# Patient Record
Sex: FEMALE | Race: WHITE
[De-identification: names, ages, dates, MRNs, and addresses within clinical notes are randomized per-mention and may not be internally consistent; named-entity substitution may affect disease eponyms.]

---

## 2018-03-30 ENCOUNTER — HOSPITAL ENCOUNTER (OUTPATIENT)
Dept: HOSPITAL 74 - JER | Age: 38
Setting detail: OBSERVATION
LOS: 2 days | Discharge: HOME | End: 2018-04-01
Attending: INTERNAL MEDICINE | Admitting: HOSPITALIST
Payer: COMMERCIAL

## 2018-03-30 VITALS — BODY MASS INDEX: 20.7 KG/M2

## 2018-03-30 DIAGNOSIS — M51.26: ICD-10-CM

## 2018-03-30 DIAGNOSIS — M62.830: Primary | ICD-10-CM

## 2018-03-30 LAB
ALBUMIN SERPL-MCNC: 4.1 G/DL (ref 3.4–5)
ALP SERPL-CCNC: 47 U/L (ref 45–117)
ALT SERPL-CCNC: 17 U/L (ref 12–78)
ANION GAP SERPL CALC-SCNC: 10 MMOL/L (ref 8–16)
AST SERPL-CCNC: 12 U/L (ref 15–37)
BASOPHILS # BLD: 0.4 % (ref 0–2)
BILIRUB SERPL-MCNC: 0.6 MG/DL (ref 0.2–1)
BUN SERPL-MCNC: 8 MG/DL (ref 7–18)
CALCIUM SERPL-MCNC: 8.2 MG/DL (ref 8.5–10.1)
CHLORIDE SERPL-SCNC: 109 MMOL/L (ref 98–107)
CO2 SERPL-SCNC: 20 MMOL/L (ref 21–32)
CREAT SERPL-MCNC: 0.4 MG/DL (ref 0.55–1.02)
DEPRECATED RDW RBC AUTO: 13.7 % (ref 11.6–15.6)
EOSINOPHIL # BLD: 0 % (ref 0–4.5)
GLUCOSE SERPL-MCNC: 65 MG/DL (ref 74–106)
HCT VFR BLD CALC: 33.7 % (ref 32.4–45.2)
HGB BLD-MCNC: 11.5 GM/DL (ref 10.7–15.3)
LYMPHOCYTES # BLD: 20.1 % (ref 8–40)
MAGNESIUM SERPL-MCNC: 2.1 MG/DL (ref 1.8–2.4)
MCH RBC QN AUTO: 30.7 PG (ref 25.7–33.7)
MCHC RBC AUTO-ENTMCNC: 34.2 G/DL (ref 32–36)
MCV RBC: 89.7 FL (ref 80–96)
MONOCYTES # BLD AUTO: 9.4 % (ref 3.8–10.2)
NEUTROPHILS # BLD: 70.1 % (ref 42.8–82.8)
PLATELET # BLD AUTO: 190 K/MM3 (ref 134–434)
PMV BLD: 10.4 FL (ref 7.5–11.1)
POTASSIUM SERPLBLD-SCNC: 3.9 MMOL/L (ref 3.5–5.1)
PROT SERPL-MCNC: 7.2 G/DL (ref 6.4–8.2)
RBC # BLD AUTO: 3.76 M/MM3 (ref 3.6–5.2)
SODIUM SERPL-SCNC: 139 MMOL/L (ref 136–145)
WBC # BLD AUTO: 7.1 K/MM3 (ref 4–10)

## 2018-03-30 PROCEDURE — G0378 HOSPITAL OBSERVATION PER HR: HCPCS

## 2018-03-30 RX ADMIN — SODIUM CHLORIDE SCH MLS/HR: 9 INJECTION, SOLUTION INTRAVENOUS at 23:14

## 2018-03-30 NOTE — HP
CHIEF COMPLAINT:





PCP:





HISTORY OF PRESENT ILLNESS:








ER course was notable for:


(1)


(2)


(3)





Recent Travel:





PAST MEDICAL HISTORY:





PAST SURGICAL HISTORY:





Social History:


Smoking:


Alcohol:


Drugs: 





Family History:


Allergies





naproxen Allergy (Intermediate, Verified 03/30/18 17:01)


 Swelling








HOME MEDICATIONS:


 Home Medications











 Medication  Instructions  Recorded


 


Ibuprofen 600 mg PO PRN PRN 03/30/18








REVIEW OF SYSTEMS


CONSTITUTIONAL: 


Absent:  fever, chills, diaphoresis, generalized weakness, malaise, loss of 

appetite, weight change


HEENT: 


Absent:  rhinorrhea, nasal congestion, throat pain, throat swelling, difficulty 

swallowing, mouth swelling, ear pain, eye pain, visual changes


CARDIOVASCULAR: 


Absent: chest pain, syncope, palpitations, irregular heart rate, lightheadedness

, peripheral edema


RESPIRATORY: 


Absent: cough, shortness of breath, dyspnea with exertion, orthopnea, wheezing, 

stridor, hemoptysis


GASTROINTESTINAL:


Absent: abdominal pain, abdominal distension, nausea, vomiting, diarrhea, 

constipation, melena, hematochezia


GENITOURINARY: 


Absent: dysuria, frequency, urgency, hesitancy, hematuria, flank pain, genital 

pain


MUSCULOSKELETAL: 


Absent: myalgia, arthralgia, joint swelling, back pain, neck pain


SKIN: 


Absent: rash, itching, pallor


HEMATOLOGIC/IMMUNOLOGIC: 


Absent: easy bleeding, easy bruising, lymphadenopathy, frequent infections


ENDOCRINE:


Absent: unexplained weight gain, unexplained weight loss, heat intolerance, 

cold intolerance


NEUROLOGIC: 


Absent: headache, focal weakness or paresthesias, dizziness, unsteady gait, 

seizure, mental status changes, bladder or bowel incontinence


PSYCHIATRIC: 


Absent: anxiety, depression, suicidal or homicidal ideation, hallucinations.








PHYSICAL EXAMINATION


 Vital Signs - 24 hr











  03/30/18





  17:02


 


Temperature 98.2 F


 


Pulse Rate 84


 


Respiratory 18





Rate 


 


Blood Pressure 142/73


 


O2 Sat by Pulse 99





Oximetry (%) 











GENERAL: Awake, alert, and fully oriented, in no acute distress.


HEAD: Normal with no signs of trauma.


EYES: Pupils equal, round and reactive to light, extraocular movements intact, 

sclera anicteric, conjunctiva clear. No lid lag.


EARS, NOSE, THROAT: Ears normal, nares patent, oropharynx clear without 

exudates. Moist mucous membranes.


NECK: Normal range of motion, supple without lymphadenopathy, JVD, or masses.


LUNGS: Breath sounds equal, clear to auscultation bilaterally. No wheezes, and 

no crackles. No accessory muscle use.


HEART: Regular rate and rhythm, normal S1 and S2 without murmur, rub or gallop.


ABDOMEN: Soft, nontender, not distended, normoactive bowel sounds, no guarding, 

no rebound, no masses.  No hepatomegaly or  splenomegaly. 


MUSCULOSKELETAL: Normal range of motion at all joints. No bony deformities or 

tenderness. No CVA tenderness.


UPPER EXTREMITIES: 2+ pulses, warm, well-perfused. No cyanosis. No clubbing. No 

peripheral edema.


LOWER EXTREMITIES: 2+ pulses, warm, well-perfused. No calf tenderness. No 

peripheral edema. 


NEUROLOGICAL:  Cranial nerves II-XII intact. Normal speech. Normal gait.


PSYCHIATRIC: Cooperative. Good eye contact. Appropriate mood and affect.


SKIN: Warm, dry, normal turgor, no rashes or lesions noted, normal capillary 

refill. 


 Laboratory Results - last 24 hr











  03/30/18 03/30/18 03/30/18





  17:32 20:00 20:00


 


WBC   7.1 


 


RBC   3.76 


 


Hgb   11.5 


 


Hct   33.7 


 


MCV   89.7 


 


MCH   30.7 


 


MCHC   34.2 


 


RDW   13.7 


 


Plt Count   190 


 


MPV   10.4 


 


Neutrophils %   70.1 


 


Lymphocytes %   20.1 


 


Monocytes %   9.4 


 


Eosinophils %   0.0 


 


Basophils %   0.4 


 


Sodium    139


 


Potassium    3.9


 


Chloride    109 H


 


Carbon Dioxide    20 L


 


Anion Gap    10


 


BUN    8


 


Creatinine    0.4 L


 


Creat Clearance w eGFR    > 60


 


Random Glucose    65 L


 


Calcium    8.2 L


 


Magnesium    2.1


 


Total Bilirubin    0.6


 


AST    12 L


 


ALT    17


 


Alkaline Phosphatase    47


 


Total Protein    7.2


 


Albumin    4.1


 


Serum Pregnancy, Qual  Negative  











ASSESSMENT/PLAN:








Visit type





- Critical Care


Critical Care patient: No





Hospitalist Screening





- Colonoscopy Questionnaire


Colonoscopy Questionnaire: 





Colonoscopy Questionnaire

## 2018-03-30 NOTE — HP
CHIEF COMPLAINT: Severe back pain





PCP: Dr. Eddy





HISTORY OF PRESENT ILLNESS:


58 year old F with pmh of back spasms (2 yrs ago tx with IM injections and PT) 

presented with severe back spasms. Patient states her back started becoming 

tight on Monday and then today went into bad spasms after she woke up. The pain 

was 10/10 in her left lower back radiating circumferentially around her left 

thigh. She tried ibuprofen with no help. Patient states the pain is sharp and 

shooting and is 10/10. Patient states that she had a back injury 6 years ago 

where she "threw out her back". She denies any fever, chills, chest pain, sob, 

abdominal pain, numbness, tingling, weakness, heavy vaginal bleeding, irregular 

vaginal bleeding, n/v/d/c. Patient denies loss of bowel/bladder function. 

Patient denies any trauma to the area. 





ER course was notable for:


(1) labs


(2) pain medications





Recent Travel:


denies


PAST MEDICAL HISTORY:


back spams


PAST SURGICAL HISTORY:


denies


Social History:


Smoking: rarely


Alcohol: 3-4 drinks/week


Drugs:  denies





Family History:


Allergies





naproxen Allergy (Intermediate, Verified 03/30/18 17:01)


 Swelling








HOME MEDICATIONS:


 Home Medications











 Medication  Instructions  Recorded


 


Ibuprofen 600 mg PO PRN PRN 03/30/18








REVIEW OF SYSTEMS


CONSTITUTIONAL: 


Absent:  fever, chills, diaphoresis, generalized weakness, malaise, loss of 

appetite, weight change


HEENT: 


Absent:  rhinorrhea, nasal congestion, throat pain, throat swelling, difficulty 

swallowing, mouth swelling, ear pain, eye pain, visual changes


CARDIOVASCULAR: 


Absent: chest pain, syncope, palpitations, irregular heart rate, lightheadedness

, peripheral edema


RESPIRATORY: 


Absent: cough, shortness of breath, dyspnea with exertion, orthopnea, wheezing, 

stridor, hemoptysis


GASTROINTESTINAL:


Absent: abdominal pain, abdominal distension, nausea, vomiting, diarrhea, 

constipation, melena, hematochezia


GENITOURINARY: 


Absent: dysuria, frequency, urgency, hesitancy, hematuria, flank pain, genital 

pain


MUSCULOSKELETAL: 


Absent: myalgia, arthralgia, joint swelling, back pain, neck pain


SKIN: 


Absent: rash, itching, pallor


HEMATOLOGIC/IMMUNOLOGIC: 


Absent: easy bleeding, easy bruising, lymphadenopathy, frequent infections


ENDOCRINE:


Absent: unexplained weight gain, unexplained weight loss, heat intolerance, 

cold intolerance


NEUROLOGIC: 


Absent: headache, focal weakness or paresthesias, dizziness, unsteady gait, 

seizure, mental status changes, bladder or bowel incontinence


PSYCHIATRIC: 


Absent: anxiety, depression, suicidal or homicidal ideation, hallucinations.








PHYSICAL EXAMINATION


 Vital Signs - 24 hr











  03/30/18





  17:02


 


Temperature 98.2 F


 


Pulse Rate 84


 


Respiratory 18





Rate 


 


Blood Pressure 142/73


 


O2 Sat by Pulse 99





Oximetry (%) 











GENERAL: Awake, alert, and fully oriented, in no acute distress.


HEAD: Normal with no signs of trauma.


EYES: Extraocular movements intact, sclera anicteric, conjunctiva clear. No lid 

lag.


EARS, NOSE, THROAT: Moist mucous membranes


NECK: Decreased ROM when attempting to put chin to chest, supple without 

lymphadenopathy, JVD, or masses.


LUNGS: Breath sounds equal, clear to auscultation bilaterally--anteriorly. No 

wheezes, and no crackles. No accessory muscle use.


HEART: Regular rate and rhythm, normal S1 and S2 without murmur, rub or gallop.


ABDOMEN: Soft, nontender, not distended, normoactive bowel sounds, no guarding, 

no rebound, no masses.  No hepatomegaly or  splenomegaly. 


MUSCULOSKELETAL: +Tenderness to palpation of left lower back


UPPER EXTREMITIES: 2+ pulses, warm, well-perfused. No cyanosis. No clubbing. No 

peripheral edema.


LOWER EXTREMITIES: 2+ pulses, warm, well-perfused. No calf tenderness. No 

peripheral edema. 


PSYCHIATRIC: Cooperative. Good eye contact. Appropriate mood and affect.


SKIN: Warm, dry, normal turgor, no rashes or lesions noted, normal capillary 

refill. 


 Laboratory Results - last 24 hr











  03/30/18 03/30/18 03/30/18





  17:32 20:00 20:00


 


WBC   7.1 


 


RBC   3.76 


 


Hgb   11.5 


 


Hct   33.7 


 


MCV   89.7 


 


MCH   30.7 


 


MCHC   34.2 


 


RDW   13.7 


 


Plt Count   190 


 


MPV   10.4 


 


Neutrophils %   70.1 


 


Lymphocytes %   20.1 


 


Monocytes %   9.4 


 


Eosinophils %   0.0 


 


Basophils %   0.4 


 


Sodium    139


 


Potassium    3.9


 


Chloride    109 H


 


Carbon Dioxide    20 L


 


Anion Gap    10


 


BUN    8


 


Creatinine    0.4 L


 


Creat Clearance w eGFR    > 60


 


Random Glucose    65 L


 


Calcium    8.2 L


 


Magnesium    2.1


 


Total Bilirubin    0.6


 


AST    12 L


 


ALT    17


 


Alkaline Phosphatase    47


 


Total Protein    7.2


 


Albumin    4.1


 


Serum Pregnancy, Qual  Negative  











ASSESSMENT/PLAN:


37 year old F with pmh of back spasms admitted for intractable back pain/spasms





#Intractable back pain/spasms


-NS @ 100 cc/hr


-Valium prn


-Flexeril daily


-Morphine prn


-Tylenol prn


-check cpk/ua





#fen/gi


-ns @ 100 cc/hr


-wnl


-regular diet





#ppx


-Teds





#obs





Visit type





- Emergency Visit


Emergency Visit: Yes


Care time: The patient presented to the Emergency Department on the above date 

and was hospitalized for further evaluation of their emergent condition.





- New Patient


This patient is new to me today: Yes


Date on this admission: 03/30/18





- Critical Care


Critical Care patient: No





Hospitalist Screening





- Colonoscopy Questionnaire


Colonoscopy Questionnaire: 





Colonoscopy Questionnaire








-   Patient:


50 - 75 years old and never had a screening colonoscopy: Unknown


History of colon or rectal polyps, or CA: Unknown


History of IBD, Crohn's disease or UC: Unknown


History of abdominal radiation therapy as a child: Unknown





-   Relative:


1 with colon or rectal CA, or polyps at age 60 or younger: Unknown


Colon or rectal CA diagnosed at age 45 or younger: Unknown


Multiple relatives with colon or rectal CA: Unknown





-   Outcome:


Screening Result: Negative Screen

## 2018-03-30 NOTE — PDOC
History of Present Illness





- General


History Source: Patient, Sibling


Exam Limitations: No Limitations





- History of Present Illness


Initial Comments: 





03/30/18 17:31


The patient is 37 year old female, with a significant past medical history of 

back pain, who is brought to the ER accompanied by family member complaining of 

lower back pain since earlier today. The patient reports getting a massage on 

Monday, which triggered muscle soreness. She reports using epsom salt baths and 

ice packs with mild relief of symptoms. However today while showering patient 

reports the pain became worse. The patient states she then laid bed and applied 

muscle stimulators to her lower back with minimal relief of symptoms. Patient 

reports pain is exacerbated with movement.  The patient reports a history of 

back pain in the past, for which she has followed up with neurology and was 

told the pain was muscular not skeletal. At the time, patient states she 

received an injection which alleviated her symptoms. The patient states she 

takes Ibuprofen for her pain as needed. The patient reports her last meal was 

yesterday. She denies any flank pain, dysuria, or hematuria. She denies any 

fever, chills, headache, or dizziness. She denies any recent travel or sick 

contacts.





Allergies: Naproxen 


Past Surgical History: None reported


Social History: Social ETOH use. Non smoker. No recreational drug use.








<Katharine Weiss - Last Filed: 03/30/18 17:31>





<Carmelita Dominguez - Last Filed: 03/30/18 19:16>





- General


History Source: Patient


Exam Limitations: No Limitations





<Ariel Fay - Last Filed: 03/30/18 22:06>





- General


Chief Complaint: Back Pain


Stated Complaint: PAIN, ACUTE


Time Seen by Provider: 03/30/18 16:50





Past History





<Katharine Weiss - Last Filed: 03/30/18 17:31>





<Carmelita Dominguez - Last Filed: 03/30/18 19:16>





- Past Medical History


COPD: No


Other medical history: BACK PAIN.





- Suicide/Smoking/Psychosocial Hx


Smoking History: Never smoked


Hx Alcohol Use: No


Drug/Substance Use Hx: No


Substance Use Type: None





<Ariel Fay - Last Filed: 03/30/18 22:06>





- Past Medical History


Allergies/Adverse Reactions: 


 Allergies











Allergy/AdvReac Type Severity Reaction Status Date / Time


 


naproxen Allergy Intermediate Swelling Verified 03/30/18 17:01











Home Medications: 


Ambulatory Orders





Ibuprofen 600 mg PO PRN PRN 03/30/18 











**Review of Systems





- Review of Systems


Able to Perform ROS?: Yes


Comments:: 





03/30/18 17:31


GENERAL/CONSTITUTIONAL: No fever or chills. No weakness.


HEAD, EYES, EARS, NOSE AND THROAT: No change in vision. No ear pain or 

discharge. No sore throat.


CARDIOVASCULAR: No chest pain or shortness of breath.


RESPIRATORY: No cough, wheezing, or hemoptysis.


GASTROINTESTINAL: No nausea, vomiting, diarrhea or constipation.


GENITOURINARY: No dysuria, frequency, or change in urination.


MUSCULOSKELETAL: (+) lower back pain. No neck pain.


SKIN: No rash


NEUROLOGIC: No headache, vertigo, loss of consciousness, or change in strength/

sensation.


ENDOCRINE: No increased thirst. No abnormal weight change.


HEMATOLOGIC/LYMPHATIC: No anemia, easy bleeding, or history of blood clots.


ALLERGIC/IMMUNOLOGIC: No hives or skin allergy.








<Katharine Weiss - Last Filed: 03/30/18 17:31>





*Physical Exam





- Vital Signs


 Last Vital Signs











Temp Pulse Resp BP Pulse Ox


 


 98.2 F   84   18   142/73   99 


 


 03/30/18 17:02  03/30/18 17:02  03/30/18 17:02  03/30/18 17:02  03/30/18 17:02














- Physical Exam


Comments: 





03/30/18 17:31


GENERAL: Awake, alert, and fully oriented, appears uncomfortable


HEAD: No signs of trauma


EYES: PERRLA, EOMI, sclera anicteric, conjunctiva clear


ENT: Auricles normal inspection, hearing grossly normal, nares patent, 

oropharynx clear without exudates. Moist mucosa


NECK: Normal ROM, supple, no lymphadenopathy, JVD, or masses


LUNGS: Breath sounds equal, clear to auscultation bilaterally.  No wheezes, and 

no crackles


HEART: Regular rate and rhythm, normal S1 and S2, no murmurs, rubs or gallops


BACK: (+) muscle spasm, left lower back tenderness to palpation, No bony 

tenderness or step offs 


EXTREMITIES: Normal range of motion, no edema.  No clubbing or cyanosis. No 

cords, erythema, or tenderness


NEUROLOGICAL: Cranial nerves II through XII grossly intact.  Normal speech, 

normal gait


SKIN: Warm, Dry, normal turgor, no rashes or lesions noted.








<Katharine Weiss - Last Filed: 03/30/18 17:31>





- Vital Signs


 Last Vital Signs











Temp Pulse Resp BP Pulse Ox


 


 98.2 F   84   18   142/73   99 


 


 03/30/18 17:02  03/30/18 17:02  03/30/18 17:02  03/30/18 17:02  03/30/18 17:02














<Carmelita Dominguez - Last Filed: 03/30/18 19:16>





- Vital Signs


 Last Vital Signs











Temp Pulse Resp BP Pulse Ox


 


 98.2 F   84   18   142/73   99 


 


 03/30/18 17:02  03/30/18 17:02  03/30/18 17:02  03/30/18 17:02  03/30/18 17:02














<Ariel Fay - Last Filed: 03/30/18 22:06>





ED Treatment Course





- ADDITIONAL ORDERS


Additional order review: 


 Laboratory  Results











  03/30/18





  17:32


 


Serum Pregnancy, Qual  Negative














- Medications


Given in the ED: 


ED Medications














Discontinued Medications














Generic Name Dose Route Start Last Admin





  Trade Name Freq  PRN Reason Stop Dose Admin


 


Acetaminophen  1,000 mg  03/30/18 17:09  03/30/18 17:56





  Ofirmev Injection -  IVPB  03/30/18 17:10  1,000 mg





  ONCE ONE   Administration


 


Diazepam  5 mg  03/30/18 18:29  03/30/18 18:56





  Valium Injection -  IVPUSH  03/30/18 18:30  Not Given





  ONCE ONE   


 


Diazepam  5 mg  03/30/18 18:55  03/30/18 19:00





  Valium -  PO  03/30/18 18:56  5 mg





  ONCE ONE   Administration


 


Morphine Sulfate  4 mg  03/30/18 17:09  03/30/18 17:31





  Morphine Injection -  IVPUSH  03/30/18 17:10  4 mg





  ONCE ONE   Administration


 


Oxycodone HCl  5 mg  03/30/18 18:50  03/30/18 19:00





  Roxicodone -  PO  03/30/18 18:51  5 mg





  ONCE ONE   Administration


 


Sodium Chloride  1,000 ml  03/30/18 17:09  03/30/18 17:30





  Normal Saline -  IV  03/30/18 17:10  1,000 ml





  ONCE ONE   Administration














<Carmelita Dominguez - Last Filed: 03/30/18 19:16>





- LABORATORY


CBC & Chemistry Diagram: 


 03/30/18 20:00





 03/30/18 20:00





<Ariel Fay - Last Filed: 03/30/18 22:06>





Medical Decision Making





- Medical Decision Making





03/30/18 17:33





A portion of this note was documented by scribe services under my direction. I 

have reviewed the details of the note, within reason, and agree with the 

documentation with the following case summary and management plan written by 

me. 





Patient treated in the ED.





Nursing notes are reviewed and incorporated into the medical decision-making.


Vital signs reviewed.





Peripheral IV access obtained by the nurse, laboratory studies are drawn and 

sent, reviewed and interpreted by myself. 





 Vital Signs











Temp Pulse Resp BP Pulse Ox


 


 98.2 F   84   18   142/73   99 


 


 03/30/18 17:02  03/30/18 17:02  03/30/18 17:02  03/30/18 17:02  03/30/18 17:02








37-year-old female with past medical history of back spasms presents with 

sudden onset of back spasms today. Patient has had intermittent lower back 

spasms for several years. Yesterday night, the patient went out and had several 

drinks of alcohol. Today, patient start noticing some severe left lower back 

spasm without associated dysuria, urinary frequency, urinary or bowel 

incontinence. States that occasionally radiates onto the buttocks. Denies any 

numbness or weakness. States that she takes ibuprofen but the pain has not been 

relieved. Came via EMS for severe pain.





I suspect this is likely muscle spasm at this time. We'll attempt to control 

pain and give IV hydration and reassess.


03/30/18 22:05





 CBC, BMP





 03/30/18 20:00 





 03/30/18 20:00 





 CMP











Sodium  139 mmol/L (136-145)   03/30/18  20:00    


 


Potassium  3.9 mmol/L (3.5-5.1)   03/30/18  20:00    


 


Chloride  109 mmol/L ()  H  03/30/18  20:00    


 


Carbon Dioxide  20 mmol/L (21-32)  L  03/30/18  20:00    


 


Anion Gap  10  (8-16)   03/30/18  20:00    


 


BUN  8 mg/dL (7-18)   03/30/18  20:00    


 


Creatinine  0.4 mg/dL (0.55-1.02)  L  03/30/18  20:00    


 


Creat Clearance w eGFR  > 60  (>60)   03/30/18  20:00    


 


Random Glucose  65 mg/dL ()  L  03/30/18  20:00    


 


Calcium  8.2 mg/dL (8.5-10.1)  L  03/30/18  20:00    


 


Magnesium  2.1 mg/dL (1.8-2.4)   03/30/18  20:00    


 


Total Bilirubin  0.6 mg/dL (0.2-1.0)   03/30/18  20:00    


 


AST  12 U/L (15-37)  L  03/30/18  20:00    


 


ALT  17 U/L (12-78)   03/30/18  20:00    


 


Alkaline Phosphatase  47 U/L ()   03/30/18  20:00    


 


Total Protein  7.2 g/dl (6.4-8.2)   03/30/18  20:00    


 


Albumin  4.1 g/dl (3.4-5.0)   03/30/18  20:00    


 


Serum Pregnancy, Qual  Negative   03/30/18  17:32    








Labs reviewed.


Pt with severe back spasm despite numerous medications.


Given severe pain, will send CPK to r/o rhabdo.


IVF, pain control, muscle relaxants.


Case discussed with [facility] hospitalist who accepts patient to med/surg obs





Case discussed in detail with admitting physician including history, physical 

exam and ancillary studies.





Admitting physician has assumed care for the patient, will follow all pending 

diagnostics and will complete the evaluation and treatment.  








<Ariel Fay - Last Filed: 03/30/18 22:06>





*DC/Admit/Observation/Transfer





- Attestations


Scribe Attestion: 





03/30/18 17:31





Documentation prepared by Katharine Weiss, acting as medical scribe for Ariel Fay MD.








<Katharine Weiss - Last Filed: 03/30/18 17:31>





<Carmelita Dominguez - Last Filed: 03/30/18 19:16>





- Discharge Dispostion


Admit: Yes





<Ariel Fay - Last Filed: 03/30/18 22:06>


Diagnosis at time of Disposition: 


 Back spasm








- Discharge Dispostion


Condition at time of disposition: Stable





- Referrals


Referrals: 


Yoselyn Wick MD [Primary Care Provider] -

## 2018-03-31 LAB
AMPHET UR-MCNC: NEGATIVE NG/ML
ANION GAP SERPL CALC-SCNC: 11 MMOL/L (ref 8–16)
APPEARANCE UR: (no result)
BACTERIA #/AREA URNS HPF: (no result) /HPF
BARBITURATES UR-MCNC: NEGATIVE NG/ML
BASOPHILS # BLD: 0.2 % (ref 0–2)
BENZODIAZ UR SCN-MCNC: POSITIVE NG/ML
BILIRUB UR STRIP.AUTO-MCNC: NEGATIVE MG/DL
BUN SERPL-MCNC: 4 MG/DL (ref 7–18)
CALCIUM SERPL-MCNC: 8.1 MG/DL (ref 8.5–10.1)
CHLORIDE SERPL-SCNC: 112 MMOL/L (ref 98–107)
CO2 SERPL-SCNC: 18 MMOL/L (ref 21–32)
COCAINE UR-MCNC: NEGATIVE NG/ML
COLOR UR: (no result)
CREAT SERPL-MCNC: 0.4 MG/DL (ref 0.55–1.02)
DEPRECATED RDW RBC AUTO: 13.5 % (ref 11.6–15.6)
EOSINOPHIL # BLD: 1.2 % (ref 0–4.5)
EPITH CASTS URNS QL MICRO: (no result) /HPF
GLUCOSE SERPL-MCNC: 95 MG/DL (ref 74–106)
HCT VFR BLD CALC: 31.7 % (ref 32.4–45.2)
HGB BLD-MCNC: 10.6 GM/DL (ref 10.7–15.3)
KETONES UR QL STRIP: (no result)
LEUKOCYTE ESTERASE UR QL STRIP.AUTO: (no result)
LYMPHOCYTES # BLD: 30.3 % (ref 8–40)
MCH RBC QN AUTO: 30.3 PG (ref 25.7–33.7)
MCHC RBC AUTO-ENTMCNC: 33.5 G/DL (ref 32–36)
MCV RBC: 90.5 FL (ref 80–96)
METHADONE UR-MCNC: NEGATIVE NG/ML
MONOCYTES # BLD AUTO: 7.9 % (ref 3.8–10.2)
NEUTROPHILS # BLD: 60.4 % (ref 42.8–82.8)
NITRITE UR QL STRIP: NEGATIVE
OPIATES UR QL SCN: POSITIVE NG/ML
PCP UR QL SCN: NEGATIVE NG/ML
PH UR: 6 [PH] (ref 5–8)
PLATELET # BLD AUTO: 172 K/MM3 (ref 134–434)
PMV BLD: 9.8 FL (ref 7.5–11.1)
POTASSIUM SERPLBLD-SCNC: 3.3 MMOL/L (ref 3.5–5.1)
PROT UR QL STRIP: (no result)
PROT UR QL STRIP: NEGATIVE
RBC # BLD AUTO: 3.5 M/MM3 (ref 3.6–5.2)
RBC # UR STRIP: (no result) /UL
SODIUM SERPL-SCNC: 141 MMOL/L (ref 136–145)
SP GR UR: 1.01 (ref 1–1.03)
UROBILINOGEN UR STRIP-MCNC: NEGATIVE MG/DL (ref 0.2–1)
WBC # BLD AUTO: 8.5 K/MM3 (ref 4–10)

## 2018-03-31 RX ADMIN — SODIUM CHLORIDE SCH MLS/HR: 9 INJECTION, SOLUTION INTRAVENOUS at 05:06

## 2018-03-31 RX ADMIN — IBUPROFEN SCH MG: 800 INJECTION INTRAVENOUS at 15:25

## 2018-03-31 RX ADMIN — SODIUM CHLORIDE SCH MLS/HR: 9 INJECTION, SOLUTION INTRAVENOUS at 14:55

## 2018-03-31 RX ADMIN — CYCLOBENZAPRINE HYDROCHLORIDE SCH MG: 10 TABLET, FILM COATED ORAL at 10:17

## 2018-03-31 RX ADMIN — IBUPROFEN SCH MG: 800 INJECTION INTRAVENOUS at 20:11

## 2018-03-31 RX ADMIN — POTASSIUM CHLORIDE SCH MEQ: 1500 TABLET, EXTENDED RELEASE ORAL at 21:49

## 2018-03-31 RX ADMIN — POTASSIUM CHLORIDE SCH MEQ: 1500 TABLET, EXTENDED RELEASE ORAL at 14:47

## 2018-03-31 NOTE — PN
Teaching Attending Note


Name of Resident: Deniz Whiting





ATTENDING PHYSICIAN STATEMENT





I saw and evaluated the patient.


I reviewed the resident's note and discussed the case with the resident.


I agree with the resident's findings and plan as documented.








SUBJECTIVE:








OBJECTIVE:








ASSESSMENT AND PLAN:


36 y/o female presented for back spasm that started about 4 years ago presented 

with similar symptoms to the one she had before, she stated she has been 

symptom free for over 2 years.  





plan:


pain medication 


muscle relaxants 


neurology evaluation 


consider rehab

## 2018-03-31 NOTE — PN
Physical Exam: 


SUBJECTIVE: Patient seen and examined





Patient continues to have severe back, continues to use IV morphine and valium.


OBJECTIVE:








 Vital Signs











Temperature  98.3 F   03/31/18 09:42


 


Pulse Rate  64   03/31/18 09:42


 


Respiratory Rate  18   03/31/18 09:42


 


Blood Pressure  104/57   03/31/18 09:42


 


O2 Sat by Pulse Oximetry (%)  98   03/31/18 04:30








 





GENERAL: The patient is awake, alert, and fully oriented, in no acute distress.


HEAD: Normal with no signs of trauma.


EYES: PERRL, extraocular movements intact, sclera anicteric, conjunctiva clear.

  


ENT: Ears normal,  oropharynx clear without exudates, moist mucous membranes.


NECK: Trachea midline, full range of motion, supple. 


LUNGS: Breath sounds equal, clear to auscultation bilaterally, no wheezes, no 

crackles, no accessory muscle use. 


HEART: Regular rate and rhythm, S1, S2 without murmur, rub or gallop.


ABDOMEN: Soft, nontender, nondistended, normoactive bowel sounds, no guarding, 

no rebound, no hepatosplenomegaly, no masses.


EXTREMITIES: 2+ pulses, warm, well-perfused, no edema. 


NEUROLOGICAL: Cranial nerves II through XII grossly intact. Normal speech, gait 

not observed.


PSYCH: Normal mood, normal affect.


SKIN: Warm, dry, normal turgor, no rashes or lesions noted.





 CBCD











WBC  8.5 K/mm3 (4.0-10.0)   03/31/18  07:00    


 


RBC  3.50 M/mm3 (3.60-5.2)  L  03/31/18  07:00    


 


Hgb  10.6 GM/dL (10.7-15.3)  L  03/31/18  07:00    


 


Hct  31.7 % (32.4-45.2)  L  03/31/18  07:00    


 


MCV  90.5 fl (80-96)   03/31/18  07:00    


 


MCHC  33.5 g/dl (32.0-36.0)   03/31/18  07:00    


 


RDW  13.5 % (11.6-15.6)   03/31/18  07:00    


 


Plt Count  172 K/MM3 (134-434)   03/31/18  07:00    


 


MPV  9.8 fl (7.5-11.1)   03/31/18  07:00    








 CMP











Sodium  141 mmol/L (136-145)   03/31/18  07:00    


 


Potassium  3.3 mmol/L (3.5-5.1)  L  03/31/18  07:00    


 


Chloride  112 mmol/L ()  H  03/31/18  07:00    


 


Carbon Dioxide  18 mmol/L (21-32)  L  03/31/18  07:00    


 


Anion Gap  11  (8-16)   03/31/18  07:00    


 


BUN  4 mg/dL (7-18)  L  03/31/18  07:00    


 


Creatinine  0.4 mg/dL (0.55-1.02)  L  03/30/18  20:00    


 


Creat Clearance w eGFR  > 60  (>60)   03/30/18  20:00    


 


Random Glucose  65 mg/dL ()  L  03/30/18  20:00    


 


Calcium  8.1 mg/dL (8.5-10.1)  L  03/31/18  07:00    


 


Total Bilirubin  0.6 mg/dL (0.2-1.0)   03/30/18  20:00    


 


AST  12 U/L (15-37)  L  03/30/18  20:00    


 


ALT  17 U/L (12-78)   03/30/18  20:00    


 


Alkaline Phosphatase  47 U/L ()   03/30/18  20:00    


 


Total Protein  7.2 g/dl (6.4-8.2)   03/30/18  20:00    


 


Albumin  4.1 g/dl (3.4-5.0)   03/30/18  20:00    








 CARDIAC ENZYMES











Creatine Kinase  53 IU/L ()   03/30/18  22:40    








 Current Medications











Generic Name Dose Route Start Last Admin





  Trade Name Freq  PRN Reason Stop Dose Admin


 


Acetaminophen  650 mg  03/30/18 22:46  03/31/18 02:28





  Tylenol -  PO   650 mg





  Q4H PRN   Administration





  PAIN LEVEL 4 - 6   


 


Cyclobenzaprine HCl  10 mg  03/31/18 08:59  03/31/18 10:17





  Flexeril -  PO   10 mg





  DAILY CONSUELO   Administration


 


Diazepam  5 mg  03/30/18 22:46  03/31/18 07:43





  Valium -  PO   5 mg





  Q8H PRN   Administration





  MUSCLE SPASMS   


 


Sodium Chloride  1,000 mls @ 100 mls/hr  03/30/18 23:00  03/31/18 05:06





  Normal Saline -  IV   100 mls/hr





  ASDIR CONSUELO   Administration


 


Ibuprofen  600 mg  03/31/18 13:45  





  Caldolor Injection -  IVPB  04/01/18 13:44  





  Q6H CONSUELO   


 


Morphine Sulfate  4 mg  03/30/18 22:46  03/31/18 12:10





  Morphine Sulfate  IVPUSH   4 mg





  Q4H PRN   Administration





  PAIN LEVEL 7 - 10   


 


Potassium Chloride  20 meq  03/31/18 13:15  





  K-Dur -  PO   





  BID CONSUELO   








 Home Medications











 Medication  Instructions  Recorded


 


Ibuprofen 600 mg PO PRN PRN 03/30/18











A/P:


Patient is 38 y/o female presented for severe back spasm , had a similar 

episode 4 yrs ago and has been free of symptoms for the past 2 yrs.  





# Severe back pain/spasm will place the patient on IV Motrin 600mg q6h, 

scheduled dose x 1 day, CT of Lspine, neuro consult. 


consider ortho consult if pain continues.





DVT px: early ambulation, SCds while in bed. 








 





Visit type





- Emergency Visit


Emergency Visit: Yes


ED Registration Date: 03/30/18


Care time: The patient presented to the Emergency Department on the above date 

and was hospitalized for further evaluation of their emergent condition.





- New Patient


This patient is new to me today: Yes


Date on this admission: 03/31/18





- Critical Care


Critical Care patient: No





- Discharge Referral


Referred to [facility] No

## 2018-04-01 VITALS — HEART RATE: 88 BPM | SYSTOLIC BLOOD PRESSURE: 101 MMHG | DIASTOLIC BLOOD PRESSURE: 65 MMHG | TEMPERATURE: 98.8 F

## 2018-04-01 PROCEDURE — 3E023BZ INTRODUCTION OF ANESTHETIC AGENT INTO MUSCLE, PERCUTANEOUS APPROACH: ICD-10-PCS | Performed by: SPECIALIST

## 2018-04-01 PROCEDURE — 3E0233Z INTRODUCTION OF ANTI-INFLAMMATORY INTO MUSCLE, PERCUTANEOUS APPROACH: ICD-10-PCS | Performed by: SPECIALIST

## 2018-04-01 RX ADMIN — IBUPROFEN SCH MG: 800 INJECTION INTRAVENOUS at 07:18

## 2018-04-01 RX ADMIN — SODIUM CHLORIDE SCH MLS/HR: 9 INJECTION, SOLUTION INTRAVENOUS at 02:23

## 2018-04-01 RX ADMIN — POTASSIUM CHLORIDE SCH MEQ: 1500 TABLET, EXTENDED RELEASE ORAL at 09:40

## 2018-04-01 RX ADMIN — IBUPROFEN SCH MG: 800 INJECTION INTRAVENOUS at 02:22

## 2018-04-01 RX ADMIN — CYCLOBENZAPRINE HYDROCHLORIDE SCH MG: 10 TABLET, FILM COATED ORAL at 09:41

## 2018-04-01 NOTE — DS
Physical Exam: 


SUBJECTIVE: Patient seen and examined; back pain improved. NO new complaints. 








OBJECTIVE:





 Vital Signs











 Period  Temp  Pulse  Resp  BP Sys/Purdy  Pulse Ox


 


 Last 24 Hr  97.6 F-98.5 F  75-99  18-18  110-118/65-85  








PHYSICAL EXAM





GENERAL: The patient is awake, alert, and fully oriented, in no acute distress.


LUNGS: Breath sounds equal, clear to auscultation bilaterally, no wheezes, no 

crackles, no accessory muscle use. 


HEART: Regular rate and rhythm, S1, S2 without murmur, rub or gallop.


ABDOMEN: Soft, nontender, nondistended, normoactive bowel sounds, no guarding, 

no rebound, no hepatosplenomegaly, no masses.


EXTREMITIES: 2+ pulses, warm, well-perfused, no edema. 


NEUROLOGICAL: Cranial nerves II through XII grossly intact. Normal speech, gait 

normal. hyperreflexia; sensation intact throughout; motor strength in tact


BACK: no spinal point tenderness; 


PSYCH: Normal mood, normal affect.


SKIN: Warm, dry, normal turgor, no rashes or lesions noted.





LABS


 Laboratory Results - last 24 hr











  03/31/18 03/31/18





  07:00 10:00


 


Creatinine  0.4 L 


 


Random Glucose  95 


 


Opiates Screen   Positive


 


Benzodiazepines Screen   Positive











HOSPITAL COURSE:





Date of Admission:03/30/18





Date of Discharge: 04/01/18


This is a 37 year old female with chronic back pain and history of back spasms, 

presented with low back pain found to have small central L4-L5 disc herniation 

seen on CT of lumbar spine. She was treated with scheduled IV ibuprofen, which 

relieved initial pain. Neurology was consulted. Dr. Reid placed lumbar 

paraspinal trigger point injections with Bupivicaine and Depomedrol. She will 

follow up with him this Tuesday, 4/3/18.











Minutes to complete discharge: 40





<Mireya Zaidi - Last Filed: 04/01/18 16:15>


Physical Exam: 


 Patient is feeling better with no acute distress. patient will follow with 

 neurologist for further w/u, has an appointment this Tuesday 4/3/2018. 














<Calvin Chung - Last Filed: 04/02/18 15:18>





Discharge Summary


Reason For Visit: SPASM OF BACK MUSCLES


Current Active Problems





Disc herniation (Acute)











- Home Medications


Comprehensive Discharge Medication List: 


Ambulatory Orders





Ibuprofen 600 mg PO PRN PRN 03/30/18 











<Mireya Zaidi - Last Filed: 04/01/18 16:15>





- Home Medications


Comprehensive Discharge Medication List: 


Ambulatory Orders





Ibuprofen 600 mg PO PRN PRN 03/30/18 











<Calvin Chung - Last Filed: 04/02/18 15:18>


Condition: Stable





- Instructions


Diet, Activity, Other Instructions: 


Ms. Cesar, you have been diagnosed with a small herniation of L4-L5 disc. We 

recommend physical therapy and to follow up with your neurologist on Tuesday. 

Please make an appointment with your primary with in one week for your hospital 

follow up visit. If you experience any further pain, you may take Motrin as 

needed. Be advised that Motrin is an NSAID, which can cause bleeding and kidney 

damage, please use sparingly. 





If you experience any worsening of symptoms including, inability to ambulate, 

bowel or bladder incontinence, intractable pain, please return to the emergency 

room. 








Referrals: 


Claudia Reid MD [Staff Physician] - 04/03/18


Yoselyn Wick MD [Primary Care Provider] - 


Disposition: HOME


This patient is new to me today: Yes


Date on this admission: 04/01/18


Emergency Visit: Yes


ED Registration Date: 03/30/18


Care time: The patient presented to the Emergency Department on the above date 

and was hospitalized for further evaluation of their emergent condition.


Critical Care patient: No





- Discharge Referral


Referred to [facility] No





<Mireya Zaidi - Last Filed: 04/01/18 16:15>

## 2018-04-01 NOTE — CON.NEURO
Consult


Consult Specialty:: Neurology-PAYAL AGUIAR





- History of Present Illness


History of Present Illness: 





58 year old F with pmh of back spasms (2 yrs ago tx with IM injections and PT) 

presented with severe back spasms. Patient states her back started becoming 

tight on Monday and then today went into bad spasms after she woke up. The pain 

was 10/10 in her left lower back radiating circumferentially around her left 

thigh. She tried ibuprofen with no help. Patient states the pain is sharp and 

shooting and is 10/10. Patient states that she had a back injury 6 years ago 

where she "threw out her back". She denies any fever, chills, chest pain, sob, 

abdominal pain, numbness, tingling, weakness, heavy vaginal bleeding, irregular 

vaginal bleeding, n/v/d/c. Patient denies loss of bowel/bladder function. 

Patient denies any trauma to the area. 





ER course was notable for:


(1) labs


(2) pain medications





-Pt. reports she first had lumbar pain 6 years ago after minimal back stress, 

rx. with PT at that time it did ot radiate. This recurred agai 2 years ago but 

rabiated to at thighs above knees, rx. with chiropractic rx/massage/nsaids and 

resolved. Now x 6 days has had lumbar/gluteal pain, bilateral, radiates on left 

to calf with left foot paresthesias. Denies bowel/bladder symptoms. No recent 

back trauma.





- Alcohol/Substance Use


Hx Alcohol Use: No





- Smoking History


Smoking history: Never smoked





Home Medications





- Allergies


Allergies/Adverse Reactions: 


 Allergies











Allergy/AdvReac Type Severity Reaction Status Date / Time


 


naproxen Allergy Intermediate Swelling Verified 03/30/18 17:01














- Home Medications


Home Medications: 


Ambulatory Orders





Ibuprofen 600 mg PO PRN PRN 03/30/18 











Physical Exam-Neuro


Vital Signs: 


 Vital Signs











Temperature  97.9 F   04/01/18 06:00


 


Pulse Rate  77   04/01/18 06:00


 


Respiratory Rate  18   04/01/18 06:00


 


Blood Pressure  113/65   04/01/18 06:00


 


O2 Sat by Pulse Oximetry (%)  99   04/01/18 04:00











Labs: 


 CBC, BMP





 03/31/18 07:00 





 03/31/18 07:00 











- Neuro Exam


Mini Mental Exam: Normal


DTR's: 2+ Left Achilles (Bilat. knee jerks are 3+), 2+ Right Achilles, 3+ Left 

Bicep, 3+ Right Bicep, 3+ Left Tricep, 3+ Right Tricep, 3+ Left Brachioradialis

, 3+ Right Brachioradialis


Babinski: Absent


Motor Strength: 5/5: Left Arm, Right Arm, Left Leg, Right Leg (+ bilateral SLR 

test at 45 degrees.)





Imaging





- Results


X-ray: Report Reviewed (CT l/s spie report pending)





Assessment/Plan





Pt. with recurrent lumbar pain and paraspinal left more than right muscle spasm

, likely L3/L4 central disc prolapse more to the left. Exam with brisk knee/ue 

jerks.





Plan:


Cont i/v Motrin


Will administer bilat. paraspinal trigger point injections today


Cymbalta 20mg bid


Outpt. PT and EMGs as outpt.


Await CT l/s spine imaging results-will review and decide whether pt. requires 

outpt. epidural injection or not.





Thank you,





Khalif Reid MD.

## 2018-04-01 NOTE — PN
Progress Note (short form)





- Note


Progress Note: 





PROCEDURE NOTE-LUMBAR PARASPINAL TRIGGER POINT INJECTIONS





-Procedure explained, verbal cosent obtained.


-Mixture of 4ccs of Bupivicaine and 40mgs of Depomedrol injected in 8 different 

points in bilateral lumbar paraspinal muscles.


-Pt. tolerated procedure well.





Khalif Reid MD.